# Patient Record
Sex: MALE | Race: WHITE | NOT HISPANIC OR LATINO | Employment: PART TIME | URBAN - METROPOLITAN AREA
[De-identification: names, ages, dates, MRNs, and addresses within clinical notes are randomized per-mention and may not be internally consistent; named-entity substitution may affect disease eponyms.]

---
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
## 2022-06-28 NOTE — PROGRESS NOTES
"[facility]  Telly Canales  2000    CC:   Chief Complaint   Patient presents with    aortic valve insufficiency         Telly Canales is a 18  y.o. 4  m.o. male who comes for follow up consultation for orthostatic dizziness.  The patient was referred for evaluation by Tonia Gonzales MD. Telly is here today with his father.    The patient needs no scheduled follow-up; however, the patient may go to an open appointment and return on an as-needed basis.    The patient states his episodes of dizziness and lightheadedness have improved with increased hydration.    The patient denies recent chest pain.      The patient has had improvement in his BMI.    The patient's bunion is still bothering him.  However, the patient has not had to have jaw surgery as expected.    Otherwise, there have been no hospitalizations or surgeries since the patient's last evaluation.  There has been no change to the family or social history.    PAST MEDICAL HISTORY:    The patient has had "episodes" over the past few years.  Last year, while the patient was being prepped for surgery, he became sweaty and ultimately passed out for a few minutes per the patient's family.  No CPR was performed.  The patient not received any medications prior to surgery.  The patient usually can resolve these "episodes" with slow, deep breathing; however, he was unable to do so in this episode.    The patient has a history of Raynaud's phenomenon.    Most Recent Cardiac Testin2019.  Electrocardiogram, [facility].  Sinus bradycardia (heart rate = 52 beats per minute), normal QTC, early repolarization.  I personally reviewed and provided the interpretation for the the electrocardiogram.      2019.  Echocardiogram, [facility].  Limited echocardiogram to evaluate: aortic valve insufficiency/stenosis  Technically difficult study due to poor acoustic windows.  Previous echocardiogram is on file.  Normal biventricular size and qualitatively " normal systolic function.   No obvious atrial septal defect, but atrial septum was not well seen from subcostal imaging plane.  No obvious ventricular septal defect or patent ductus arteriosus.    No aortic valve insufficiency or stenosis.  No significant valvular stenosis or regurgitation.    No evidence of aortic coarctation.    No pericardial effusion.  I personally reviewed and provided the interpretation for the the echocardiogram images.        Laboratory and Other Testing:   None      Current Medications:   Previous Medications    No medications on file     Allergies: Review of patient's allergies indicates:  No Known Allergies    Family History   Problem Relation Age of Onset    Diabetes type II Father     Childhood respiratory disease Father         asthma    Hypertension Father     Arrhythmia Maternal Uncle     Hypertension Maternal Grandfather     Arrhythmia Paternal Grandmother     Heart attacks under age 50 Paternal Grandfather     Hypertension Paternal Aunt     Congenital heart disease Neg Hx     Early death Neg Hx     Anemia Neg Hx     Cardiomyopathy Neg Hx     Clotting disorder Neg Hx     Deafness Neg Hx     Long QT syndrome Neg Hx     Pacemaker/defibrilator Neg Hx     Premature birth Neg Hx     Seizures Neg Hx     SIDS Neg Hx      Past Medical History:   Diagnosis Date    Aortic insufficiency     H/O chest pain     Palpitations      Social History     Socioeconomic History    Marital status: Single     Spouse name: None    Number of children: None    Years of education: None    Highest education level: None   Social Needs    Financial resource strain: None    Food insecurity - worry: None    Food insecurity - inability: None    Transportation needs - medical: None    Transportation needs - non-medical: None   Occupational History    None   Tobacco Use    Smoking status: None   Substance and Sexual Activity    Alcohol use: None    Drug use: None    Sexual activity:  "None   Other Topics Concern    None   Social History Narrative    Lives with father, step mother, and siblings.  Telly is in 11th grade.  Dad smokes outside only.  Good grades.  Telly enjoys playing [email or web address] and piano and playing video games.     Past Surgical History:   Procedure Laterality Date    BUNIONECTOMY      EYE SURGERY      URETHRA SURGERY         Past medical history, family history, surgical history, social history updated and reviewed today.     ROS   Child / Adolescent     General: No weight loss; No fever; No excess fatigue  HEENT: headaches; rhinorrhea; No earache  CV: Heart Murmur; No chest pain; No exercise intolerance; No palpitations; No diaphoresis  Respiratory: No wheezing; No chronic cough; No dyspnea; No snoring  GI: No nausea; No vomiting; No constipation; No diarrhea; No reflux symptoms; Good appetite  : No hematuria; No dysuria  Musculoskeletal: joint pains; No swollen joints  Skin: No rash  Neurologic: No fainting; No weakness; No seizures; No dizziness  Psychologic: Able to concentrate; Able to focus on tasks; No psychiatric concerns   Endocrinologic: No polyuria; No excess thirst (polydipsia); No temperature intolerance   Hematologic: bruising; bleeding    Objective:   Vitals:    02/12/19 0933   BP: 128/71   BP Location: Right arm   Patient Position: Sitting   BP Method: Large (Automatic)   Pulse: (!) 52   Resp: 20   SpO2: 99%   Weight: 92.1 kg (203 lb 0.7 oz)   Height: 6' 0.44" (1.84 m)         Physical Exam  GENERAL: Awake, Alert and oriented, cooperative with exam,, well-developed well-nourished, no apparent distress  HEENT: mucous membranes moist and pink, normocephalic, no carotid bruits, sclera anicteric  NECK:  no lymphadenopathy  CHEST: Good air movement, clear to auscultation bilaterally  CARDIOVASCULAR: Quiet precordium, regular rate and rhythm, normal S1, normally split S2, No S3 or S4, No murmur .   ABDOMEN: Soft, non-tender, non-distended, no " hepatosplenomegaly.  EXTREMITIES: Warm well perfused, 2+ radial/pedal/femoral, pulses, capillary refill 2 seconds, no clubbing, cyanosis, or edema  NEURO:  Face symmetric, moves all extremities well.  Skin: pink, good turgor, no rash       Assessment:  1. Aortic valve insufficiency, etiology of cardiac valve disease unspecified - improved    2. BMI (body mass index), pediatric, 95-99% for age - improved    3. Bradycardia        Discussion:     I have reviewed our general guidelines related to cardiac issues with the family.  I instructed them in the event of an emergency to call 911 or go to the nearest emergency room.  They know to contact the PCP if problems arise or if they are in doubt.    The patient's chest pain has resolved.    The patient has orthostatic dizziness.  This is well controlled with conservative treatment.  The patient has done well since increasing his hydration.    The patient has no evidence of significant aortic insufficiency or stenosis on today's echocardiogram.    The patient's body mass index is improved since his last evaluation.    The patient has sinus bradycardia on his electrocardiogram.  The patient is asymptomatic and no further evaluation is needed.    The patient needs no scheduled follow-up; however, the patient may go to an open appointment and return on an as-needed basis.    Special Testing Instructions:   None    Follow up with the primary care provider for the following issues: Nothing identified.    Plan:  1. Activity:No special precautions and may participate in age-appropriate activities.    2. The patient should see a dentist every 6 months for routine dental care.    No spontaneous bacterial endocarditis prophylaxis is required.    3. If anesthesia is needed for surgery, no special precautions from a cardiovascular standpoint are necessary. or arrhythmias.    4. Medications:   No current outpatient medications on file.     No current facility-administered medications  for this visit.         5. Orders placed this encounter  No orders of the defined types were placed in this encounter.      Follow-Up:     Follow-up if symptoms worsen or fail to improve.    This documentation was created using Dragon Natural Speaking voice recognition software. Content is subject to voice recognition errors.    Sincerely,      Dany Crane MD, FAAP, FACC, FASE  Board Certified in Pediatric Cardiology       colonoscopy